# Patient Record
Sex: FEMALE | Race: WHITE
[De-identification: names, ages, dates, MRNs, and addresses within clinical notes are randomized per-mention and may not be internally consistent; named-entity substitution may affect disease eponyms.]

---

## 2019-06-04 ENCOUNTER — HOSPITAL ENCOUNTER (OUTPATIENT)
Dept: HOSPITAL 39 - GMAL | Age: 74
End: 2019-06-04
Attending: FAMILY MEDICINE
Payer: MEDICARE

## 2019-06-04 DIAGNOSIS — R53.83: ICD-10-CM

## 2019-06-04 DIAGNOSIS — Z79.899: ICD-10-CM

## 2019-06-04 DIAGNOSIS — D51.3: Primary | ICD-10-CM

## 2019-06-04 DIAGNOSIS — E56.9: ICD-10-CM

## 2019-06-04 DIAGNOSIS — I10: ICD-10-CM

## 2019-06-04 DIAGNOSIS — E55.9: ICD-10-CM

## 2019-07-15 ENCOUNTER — HOSPITAL ENCOUNTER (OUTPATIENT)
Dept: HOSPITAL 39 - AMB | Age: 74
Discharge: HOME | End: 2019-07-15
Attending: OPHTHALMOLOGY
Payer: MEDICARE

## 2019-07-15 DIAGNOSIS — Z79.899: ICD-10-CM

## 2019-07-15 DIAGNOSIS — H25.12: Primary | ICD-10-CM

## 2019-07-23 ENCOUNTER — HOSPITAL ENCOUNTER (OUTPATIENT)
Dept: HOSPITAL 39 - GMAL | Age: 74
End: 2019-07-23
Attending: FAMILY MEDICINE
Payer: MEDICARE

## 2019-07-23 DIAGNOSIS — E03.8: ICD-10-CM

## 2019-07-23 DIAGNOSIS — M10.9: ICD-10-CM

## 2019-07-23 DIAGNOSIS — I10: ICD-10-CM

## 2019-07-23 DIAGNOSIS — D51.3: Primary | ICD-10-CM

## 2019-07-29 ENCOUNTER — HOSPITAL ENCOUNTER (OUTPATIENT)
Dept: HOSPITAL 39 - AMB | Age: 74
Discharge: HOME | End: 2019-07-29
Attending: OPHTHALMOLOGY
Payer: MEDICARE

## 2019-07-29 DIAGNOSIS — Z79.899: ICD-10-CM

## 2019-07-29 DIAGNOSIS — H25.11: Primary | ICD-10-CM

## 2019-07-29 DIAGNOSIS — Z88.0: ICD-10-CM

## 2019-09-18 ENCOUNTER — HOSPITAL ENCOUNTER (OUTPATIENT)
Dept: HOSPITAL 39 - GMAL | Age: 74
End: 2019-09-18
Attending: FAMILY MEDICINE
Payer: MEDICARE

## 2019-09-18 DIAGNOSIS — E03.8: Primary | ICD-10-CM

## 2019-09-18 DIAGNOSIS — I10: ICD-10-CM

## 2019-09-26 ENCOUNTER — HOSPITAL ENCOUNTER (OUTPATIENT)
Dept: HOSPITAL 39 - US | Age: 74
End: 2019-09-26
Attending: FAMILY MEDICINE
Payer: MEDICARE

## 2019-09-26 DIAGNOSIS — N28.1: ICD-10-CM

## 2019-09-26 DIAGNOSIS — K76.0: ICD-10-CM

## 2019-09-26 DIAGNOSIS — R94.5: Primary | ICD-10-CM

## 2019-09-26 NOTE — US
EXAM DESCRIPTION: 

Liver: ULTRASOUND.



CLINICAL HISTORY: 

ABN LIVER FUNCTION STUDIES



COMPARISON: 

None.



TECHNIQUE: 

Transabdominal scannin-dimensional and Doppler modes.



FINDINGS: 

Gallbladder: normal size, shape, echogenicity; no intraluminal

stones or sludge. No fluid around the gallbladder. No wall

thickening. 1.3 mm. Non-tender with transducer pressure.

Common bile duct: caliber 2.0 mm within normal limits. 

Liver:  Heterogeneously increased echogenicity; contour liver

capsule smooth where seen. No fluid around the liver.

Intrahepatic biliary ducts normal caliber.  Doppler hepatopedal

flow portal vein. 11 mm.  Long axis right lobe 12.7 cm.

Pancreas: normal size and echogenicity.  Duct not seen. 

Right kidney: In the right renal fossa there is a mostly cystic

structure with thin walls and cortex which may represent a

polycystic kidney. 9.4 x 4.7 x 4.0 cm. Channels are seen but

these are not vascular. No large calcifications or perirenal

fluid. 

Proximal abdominal aorta 1.4 cm normal caliber.



IMPRESSION: 

1. Normal size of the liver with heterogeneously fatty

infiltration. Normal portal vein flow and caliber of the ducts.

Smooth capsule with no ascites.

2. Multiple cysts or fluid compartments in the right kidney with

minimal normal parenchyma. Possibly polycystic kidney. Correlate

with clinical information and consider follow-up CT scan of the

kidneys without and with IV contrast.

3. Gallbladder common bile duct and pancreas unremarkable. No

ascites.



Electronically signed by:  Berhane Giles MD  2019 4:16 PM CDT

Workstation: [id number]

## 2019-09-30 ENCOUNTER — HOSPITAL ENCOUNTER (OUTPATIENT)
Dept: HOSPITAL 39 - CT | Age: 74
End: 2019-09-30
Attending: FAMILY MEDICINE
Payer: MEDICARE

## 2019-09-30 DIAGNOSIS — N26.1: Primary | ICD-10-CM

## 2019-09-30 DIAGNOSIS — R94.5: ICD-10-CM

## 2019-09-30 DIAGNOSIS — N20.1: ICD-10-CM

## 2019-09-30 DIAGNOSIS — N28.82: ICD-10-CM

## 2019-09-30 DIAGNOSIS — N28.89: ICD-10-CM

## 2019-09-30 NOTE — CT
EXAM DESCRIPTION: Abdomen/Pelvis w/wo Contrast



CLINICAL HISTORY: 74 years Female, ABNORMAL LFT



TECHNIQUE: This exam was performed according to our departmental

dose-optimization program, which includes automated exposure

control, adjustment of the mA and/or kV according to patient size

and/or use of iterative reconstruction technique.



COMPARISON: 9/26/2019



FINDINGS:

Calcified granuloma in the right lower lobe.



No suspicious hepatic lesion. No biliary dilatation. The

gallbladder is nondilated. The portal vein is patent.



Calcified granulomas present within the spleen. The pancreas and

adrenal glands are unremarkable.



Nonenhancing left renal cyst measuring 4.3 cm. Left renal

vascular calcifications. No left hydronephrosis or urolithiasis.



There is marked right renal atrophy with severe dilatation and

tortuosity of the renal pelvis and right ureter. The distal right

ureter is difficult to follow. There are layering calcifications

in the mid right ureter.



No urothelial lesion identified. The bladder is unremarkable.



Scattered colonic diverticula without focal inflammatory change.

No evidence of bowel obstruction. No findings to suggest

appendicitis. The small bowel is diffusely edematous and fluid

filled.



No adenopathy. No focal fluid collection. No free air. Normal

caliber abdominal aorta. Dense diffuse atherosclerotic disease.

No acute or suspicious osseous abnormality. Scattered

degenerative changes present.



IMPRESSION:



1. No CT findings to account for abnormal liver function tests.

2. Marked right renal atrophy and diffuse dilatation of the renal

pelvis and ureter. There are layering dependent calcifications in

the mid right ureter. The distal right ureter is difficult to

follow and obstructing lesion is not excluded. Recommend urologic

consultation.

3. Findings which may be seen with acute uncomplicated enteritis.



Electronically signed by:  Dayo Kuo MD  9/30/2019 1:22 PM

CDT Workstation: [id number]

## 2019-10-07 ENCOUNTER — HOSPITAL ENCOUNTER (OUTPATIENT)
Dept: HOSPITAL 39 - MAMMO | Age: 74
End: 2019-10-07
Attending: FAMILY MEDICINE
Payer: MEDICARE

## 2019-10-07 DIAGNOSIS — Z12.31: Primary | ICD-10-CM

## 2019-10-09 NOTE — MAM
EXAM DESCRIPTION: 

3D Screening BILATERAL : Digital Mammography.



CLINICAL HISTORY: 

74 years Female Screening . Right breast cancer and mastectomy

2010. Remote family history of breast cancer. No complaints.

Menarche age 13. Childbirth. Postmenopausal 35+ years. No HRT.

Left breast augmentation..  Lifetime risk of developing breast

cancer (Tyrer-Cuzick model)(%):  Due to personal history of

breast cancer.



COMPARISON: 

None available.  No prior reports available.  



TECHNIQUE: 

Left CC and MLO projection full-field images, with [employer] Implant

Displacement digital tomosynthesis mammographic technique. Left

2-D digital full-field images, MLO and CC projections,

non-displaced.  CAD not available for tomosynthesis or 2-D

images.  



FINDINGS: 

The breast parenchymal density pattern is: Extremely dense breast

tissue, which lowers the sensitivity of mammography. No skin

thickening or nipple retraction.  Anterior inferior course

calcifications in the left breast along with microcalcifications.

Microcalcifications more superior. Vascular calcifications.

Retro-muscular left breast silicone derivatives implant.

Calcification a portion of the anterior capsule. Heterogeneous

calcifications in a small group in the upper aspect of the left

breast on the MLO tomosynthesis images with implant displacement.

Not seen on the cc tomosynthesis images with implant

displacement.



IMPRESSION: 

BI-RADS CATEGORY: 0 - INCOMPLETE- Need additional imaging

evaluation.

FOLLOW-UP: Recall for additional imaging: CC MLO,  and LM 2-D

full-field images left breast with implant displacement.

Full-field LM tomosynthesis left breast with implant

displacement.. Spot magnification region of interest left breast

with implant displacement MLO CC, and LM projections. Directed

left breast ultrasound if indicated by diagnostic images.



Written communication concerning the IMPRESSION and Follow-up,

will be mailed to the patient and referring health care provider.



Electronically signed by:  Berhane Giles MD  10/9/2019 6:00 PM CDT

Workstation: [id number]

## 2019-10-28 ENCOUNTER — HOSPITAL ENCOUNTER (OUTPATIENT)
Dept: HOSPITAL 39 - MAMMO | Age: 74
End: 2019-10-28
Attending: FAMILY MEDICINE
Payer: MEDICARE

## 2019-10-28 DIAGNOSIS — R92.8: Primary | ICD-10-CM

## 2019-10-28 NOTE — US
EXAM DESCRIPTION: 

Diagnostic Mammo,Left (accession [id number]), Breast,Left

(accession [id number]): Ultrasound



CLINICAL HISTORY: 

74 yearsFemaleABNORMAL MAMMOGRAM group of microcalcifications in

the left breast. Patient has bilateral implants.



COMPARISON: 

Bilateral screening digital breast tomosynthesis with implant

displacement technique 7 October 2019.



TECHNIQUE: 

Left breast LM projection full-field images,  digital

mammographic tomosynthesis technique. Left breast 2-D digital

full-field images.  LM, CC, MLO and exaggerated lateral CC, all

with implant displacement technique CAD not available. . 

Transcutaneous scanning of the left breast utilizing gray-scale

and Doppler modes. Scanning performed by the sonographer and Dr. Giles.



FINDINGS: 

The breast parenchymal density pattern is:  Extremely dense

breast tissue, which lowers the sensitivity of mammography. No

skin thickening or nipple retraction  clustered

microcalcifications relatively homogeneous, with varying sizes in

the upper outer quadrant of the lateral left breast. 2 to 3 cm

from the left nipple. Possibly associated with a soft tissue

mass. Solitary microcalcifications, coarse calcifications, and

vascular calcifications also present.



Ultrasound: Scanning of the upper-outer quadrant of the left

breast. Implant capsule is well demonstrated and is intact where

seen. Breast tissue is almost totally fibroglandular with minimal

fatty replacement. No dominant solid mass or distinct cyst. No

parenchymal edema or large calcifications. No significant

calcific shadowing. No overlying skin changes.



IMPRESSION: 

BI-RADS CATEGORY: 3 - PROBABLY BENIGN.

Management: Short interval (6-month) diagnostic left breast

digital tomosynthesis. Directed left breast ultrasound if

indicated by diagnostic images..



The FINDINGS and the FOLLOW-UP plan were reviewed in person with

the patient after the examination. Written communication

explaining the IMPRESSION and FOLLOW-UP will be mailed to the

patient and referring care provider.



Electronically signed by:  Berhane Giles MD  10/28/2019 5:01 PM

CDT Workstation: [id number]

## 2019-10-28 NOTE — MAM
EXAM DESCRIPTION: 

Diagnostic Mammo,Left (accession [id number]), Breast,Left

(accession [id number]): Ultrasound



CLINICAL HISTORY: 

74 yearsFemaleABNORMAL MAMMOGRAM group of microcalcifications in

the left breast. Patient has bilateral implants.



COMPARISON: 

Bilateral screening digital breast tomosynthesis with implant

displacement technique 7 October 2019.



TECHNIQUE: 

Left breast LM projection full-field images,  digital

mammographic tomosynthesis technique. Left breast 2-D digital

full-field images.  LM, CC, MLO and exaggerated lateral CC, all

with implant displacement technique CAD not available. . 

Transcutaneous scanning of the left breast utilizing gray-scale

and Doppler modes. Scanning performed by the sonographer and Dr. Giles.



FINDINGS: 

The breast parenchymal density pattern is:  Extremely dense

breast tissue, which lowers the sensitivity of mammography. No

skin thickening or nipple retraction  clustered

microcalcifications relatively homogeneous, with varying sizes in

the upper outer quadrant of the lateral left breast. 2 to 3 cm

from the left nipple. Possibly associated with a soft tissue

mass. Solitary microcalcifications, coarse calcifications, and

vascular calcifications also present.



Ultrasound: Scanning of the upper-outer quadrant of the left

breast. Implant capsule is well demonstrated and is intact where

seen. Breast tissue is almost totally fibroglandular with minimal

fatty replacement. No dominant solid mass or distinct cyst. No

parenchymal edema or large calcifications. No significant

calcific shadowing. No overlying skin changes.



IMPRESSION: 

BI-RADS CATEGORY: 3 - PROBABLY BENIGN.

Management: Short interval (6-month) diagnostic left breast

digital tomosynthesis. Directed left breast ultrasound if

indicated by diagnostic images..



The FINDINGS and the FOLLOW-UP plan were reviewed in person with

the patient after the examination. Written communication

explaining the IMPRESSION and FOLLOW-UP will be mailed to the

patient and referring care provider.



Electronically signed by:  Berhane Giles MD  10/28/2019 5:01 PM

CDT Workstation: [id number]

## 2020-04-23 ENCOUNTER — HOSPITAL ENCOUNTER (OUTPATIENT)
Dept: HOSPITAL 39 - GMAL | Age: 75
End: 2020-04-23
Attending: FAMILY MEDICINE
Payer: MEDICARE

## 2020-04-23 DIAGNOSIS — D51.3: Primary | ICD-10-CM

## 2020-04-23 DIAGNOSIS — Z79.899: ICD-10-CM

## 2020-04-23 DIAGNOSIS — I10: ICD-10-CM

## 2020-04-23 DIAGNOSIS — E55.9: ICD-10-CM

## 2020-04-23 DIAGNOSIS — R53.83: ICD-10-CM

## 2020-05-04 ENCOUNTER — HOSPITAL ENCOUNTER (OUTPATIENT)
Dept: HOSPITAL 39 - MAMMO | Age: 75
End: 2020-05-04
Attending: FAMILY MEDICINE
Payer: MEDICARE

## 2020-05-04 DIAGNOSIS — Z98.890: ICD-10-CM

## 2020-05-04 DIAGNOSIS — R92.2: Primary | ICD-10-CM

## 2020-05-04 PROCEDURE — 77065 DX MAMMO INCL CAD UNI: CPT

## 2020-05-04 PROCEDURE — 76641 ULTRASOUND BREAST COMPLETE: CPT

## 2020-05-04 NOTE — US
EXAM DESCRIPTION: 

3D Diagnostic, Left (accession [id number]), Breast,Right

(accession [id number]): Ultrasound



CLINICAL HISTORY: 

74 yearsFemaleABNORMAL MAMMOGRAM six-month follow-up left breast

calcifications. Patient palpates nodule upper inner quadrant

right breast reconstruction. No personal history of breast cancer

March 2009 right. Remote family history of breast cancer.

Menarche age 12. Childbirth age 21. Hysterectomy age 38. No HRT.

Left breast augmentation. Lifetime risk of developing breast

cancer (Tyrer-Cuzick model)(%):  Not calculated due to personal

history of breast cancer.



COMPARISON: 

Left breast diagnostic tomosynthesis and directed ultrasound

October 2019.



TECHNIQUE: 

Left breast LM projection full-field images, LM, CC, and MLO

projection implant displaced: digital tomosynthesis technique.

Left breast 2-D digital full-field images:  Full-field MLO, LM,

and CC projections, with and without implant displacement CAD

available for 2-D images.. Transcutaneous scanning of the right

breast utilizing gray-scale and Doppler modes. Scanning performed

by the sonographer ; observation by Dr. Giles.



FINDINGS: 

The breast parenchymal density pattern is:  Extremely dense

breast tissue, which lowers the sensitivity of mammography. No

skin thickening or nipple retraction  group of heterogeneous

calcifications again seen in the upper outer quadrant of the left

breast 2 to 3 cm from the nipple. Stable since the prior study.

Also coarse calcifications in the breast. Vascular

calcifications. Retroglandular left breast implant again seen.

Anterior margin of the implant capsule is partially calcified.

Capsule is intact where seen.

No new focal, stellate mass or density, focal asymmetry  left

breast. Stable mammograms compared to prior study, taking into

account differences in mammographic technique



Ultrasound: Directed ultrasound right breast upper inner

quadrant. Mostly reconstructed breast tissue. Curvilinear

echogenic structures with acoustic shadowing are most likely

calcifications or related to the reconstruction. The soft tissue

around the calcifications measures approximately 1.7 cm. No

definite mass. No distinct cyst.



IMPRESSION: 

Probably benign calcifications left breast and right breast

reconstruction.



BI-RADS CATEGORY: 3 - PROBABLY BENIGN.

Management: Short interval (6-month) digital diagnostic

tomosynthesis left breast.



The FINDINGS and the FOLLOW-UP plan were reviewed in person with

the patient after the examination. Written communication

explaining the IMPRESSION and FOLLOW-UP will be mailed to the

patient and referring care provider.



Electronically signed by:  Berhane Giles MD  5/4/2020 3:36 PM CDT

Workstation: [id number]

## 2020-05-04 NOTE — MAM
EXAM DESCRIPTION: 

3D Diagnostic, Left (accession [id number]), Breast,Right

(accession [id number]): Ultrasound



CLINICAL HISTORY: 

74 yearsFemaleABNORMAL MAMMOGRAM six-month follow-up left breast

calcifications. Patient palpates nodule upper inner quadrant

right breast reconstruction. No personal history of breast cancer

March 2009 right. Remote family history of breast cancer.

Menarche age 12. Childbirth age 21. Hysterectomy age 38. No HRT.

Left breast augmentation. Lifetime risk of developing breast

cancer (Tyrer-Cuzick model)(%):  Not calculated due to personal

history of breast cancer.



COMPARISON: 

Left breast diagnostic tomosynthesis and directed ultrasound

October 2019.



TECHNIQUE: 

Left breast LM projection full-field images, LM, CC, and MLO

projection implant displaced: digital tomosynthesis technique.

Left breast 2-D digital full-field images:  Full-field MLO, LM,

and CC projections, with and without implant displacement CAD

available for 2-D images.. Transcutaneous scanning of the right

breast utilizing gray-scale and Doppler modes. Scanning performed

by the sonographer ; observation by Dr. Giles.



FINDINGS: 

The breast parenchymal density pattern is:  Extremely dense

breast tissue, which lowers the sensitivity of mammography. No

skin thickening or nipple retraction  group of heterogeneous

calcifications again seen in the upper outer quadrant of the left

breast 2 to 3 cm from the nipple. Stable since the prior study.

Also coarse calcifications in the breast. Vascular

calcifications. Retroglandular left breast implant again seen.

Anterior margin of the implant capsule is partially calcified.

Capsule is intact where seen.

No new focal, stellate mass or density, focal asymmetry  left

breast. Stable mammograms compared to prior study, taking into

account differences in mammographic technique



Ultrasound: Directed ultrasound right breast upper inner

quadrant. Mostly reconstructed breast tissue. Curvilinear

echogenic structures with acoustic shadowing are most likely

calcifications or related to the reconstruction. The soft tissue

around the calcifications measures approximately 1.7 cm. No

definite mass. No distinct cyst.



IMPRESSION: 

Probably benign calcifications left breast and right breast

reconstruction.



BI-RADS CATEGORY: 3 - PROBABLY BENIGN.

Management: Short interval (6-month) digital diagnostic

tomosynthesis left breast.



The FINDINGS and the FOLLOW-UP plan were reviewed in person with

the patient after the examination. Written communication

explaining the IMPRESSION and FOLLOW-UP will be mailed to the

patient and referring care provider.



Electronically signed by:  Berhane Giles MD  5/4/2020 3:36 PM CDT

Workstation: [id number]